# Patient Record
Sex: FEMALE
[De-identification: names, ages, dates, MRNs, and addresses within clinical notes are randomized per-mention and may not be internally consistent; named-entity substitution may affect disease eponyms.]

---

## 2022-06-10 ENCOUNTER — HOSPITAL ENCOUNTER (INPATIENT)
Dept: HOSPITAL 5 - ED | Age: 72
LOS: 3 days | Discharge: HOME | DRG: 682 | End: 2022-06-13
Attending: INTERNAL MEDICINE | Admitting: INTERNAL MEDICINE
Payer: COMMERCIAL

## 2022-06-10 DIAGNOSIS — M19.90: ICD-10-CM

## 2022-06-10 DIAGNOSIS — G40.909: ICD-10-CM

## 2022-06-10 DIAGNOSIS — I48.91: ICD-10-CM

## 2022-06-10 DIAGNOSIS — Z79.899: ICD-10-CM

## 2022-06-10 DIAGNOSIS — G93.41: ICD-10-CM

## 2022-06-10 DIAGNOSIS — E86.9: ICD-10-CM

## 2022-06-10 DIAGNOSIS — E86.0: ICD-10-CM

## 2022-06-10 DIAGNOSIS — Z82.49: ICD-10-CM

## 2022-06-10 DIAGNOSIS — N17.0: Primary | ICD-10-CM

## 2022-06-10 DIAGNOSIS — F01.50: ICD-10-CM

## 2022-06-10 DIAGNOSIS — E66.2: ICD-10-CM

## 2022-06-10 DIAGNOSIS — K21.9: ICD-10-CM

## 2022-06-10 LAB
ALBUMIN SERPL-MCNC: 4.8 G/DL (ref 3.9–5)
ALT SERPL-CCNC: 11 UNITS/L (ref 7–56)
BASOPHILS # (AUTO): 0 K/MM3 (ref 0–0.1)
BASOPHILS NFR BLD AUTO: 0.2 % (ref 0–1.8)
BUN SERPL-MCNC: 41 MG/DL (ref 7–17)
BUN/CREAT SERPL: 22 %
CALCIUM SERPL-MCNC: 10.1 MG/DL (ref 8.4–10.2)
EOSINOPHIL # BLD AUTO: 0 K/MM3 (ref 0–0.4)
EOSINOPHIL NFR BLD AUTO: 0 % (ref 0–4.3)
HCT VFR BLD CALC: 42.9 % (ref 30.3–42.9)
HEMOLYSIS INDEX: 8
HGB BLD-MCNC: 14.6 GM/DL (ref 10.1–14.3)
LYMPHOCYTES # BLD AUTO: 1 K/MM3 (ref 1.2–5.4)
LYMPHOCYTES NFR BLD AUTO: 10.6 % (ref 13.4–35)
MCHC RBC AUTO-ENTMCNC: 34 % (ref 30–34)
MCV RBC AUTO: 92 FL (ref 79–97)
MONOCYTES # (AUTO): 0.7 K/MM3 (ref 0–0.8)
MONOCYTES % (AUTO): 8 % (ref 0–7.3)
PLATELET # BLD: 249 K/MM3 (ref 140–440)
RBC # BLD AUTO: 4.66 M/MM3 (ref 3.65–5.03)
T4 FREE SERPL-MCNC: 1.24 NG/DL (ref 0.76–1.46)
T4 FREE SERPL-MCNC: 1.33 NG/DL (ref 0.76–1.46)

## 2022-06-10 PROCEDURE — 84443 ASSAY THYROID STIM HORMONE: CPT

## 2022-06-10 PROCEDURE — 82565 ASSAY OF CREATININE: CPT

## 2022-06-10 PROCEDURE — 93005 ELECTROCARDIOGRAM TRACING: CPT

## 2022-06-10 PROCEDURE — 85610 PROTHROMBIN TIME: CPT

## 2022-06-10 PROCEDURE — 70450 CT HEAD/BRAIN W/O DYE: CPT

## 2022-06-10 PROCEDURE — 84439 ASSAY OF FREE THYROXINE: CPT

## 2022-06-10 PROCEDURE — 93306 TTE W/DOPPLER COMPLETE: CPT

## 2022-06-10 PROCEDURE — 36415 COLL VENOUS BLD VENIPUNCTURE: CPT

## 2022-06-10 PROCEDURE — 82553 CREATINE MB FRACTION: CPT

## 2022-06-10 PROCEDURE — 85025 COMPLETE CBC W/AUTO DIFF WBC: CPT

## 2022-06-10 PROCEDURE — 85027 COMPLETE CBC AUTOMATED: CPT

## 2022-06-10 PROCEDURE — 83735 ASSAY OF MAGNESIUM: CPT

## 2022-06-10 PROCEDURE — 85730 THROMBOPLASTIN TIME PARTIAL: CPT

## 2022-06-10 PROCEDURE — 94640 AIRWAY INHALATION TREATMENT: CPT

## 2022-06-10 PROCEDURE — 82550 ASSAY OF CK (CPK): CPT

## 2022-06-10 PROCEDURE — 70551 MRI BRAIN STEM W/O DYE: CPT

## 2022-06-10 PROCEDURE — 80048 BASIC METABOLIC PNL TOTAL CA: CPT

## 2022-06-10 PROCEDURE — 80053 COMPREHEN METABOLIC PANEL: CPT

## 2022-06-10 PROCEDURE — 84484 ASSAY OF TROPONIN QUANT: CPT

## 2022-06-10 PROCEDURE — C8929 TTE W OR WO FOL WCON,DOPPLER: HCPCS

## 2022-06-10 RX ADMIN — Medication SCH ML: at 21:39

## 2022-06-10 RX ADMIN — ACETAMINOPHEN PRN MG: 325 TABLET ORAL at 18:53

## 2022-06-10 NOTE — CAT SCAN REPORT
CT head/brain wo con



INDICATION / CLINICAL INFORMATION:

71 years Female; syncope.



TECHNIQUE: Routine CT head without contrast. All CT scans at this location are performed using CT dos
e reduction for ALARA by means of automated exposure control.



COMPARISON: 

None.



FINDINGS:



BRAIN / INTRACRANIAL CONTENTS: No acute hemorrhage, mass effect, midline shift, hydrocephalus, or acu
te, large territorial infarct. No signs of significant atrophy or chronic infarct. No significant whi
te matter abnormality seen.



CRANIOCERVICAL JUNCTION: No significant abnormality.



ORBITS: No significant abnormality of visualized orbits.

SINUSES / MASTOIDS: Visualized paranasal sinuses and mastoid air cells are essentially clear.



ADDITIONAL FINDINGS: None. 



IMPRESSION:

1. No focal mass, hemorrhage, hydrocephalus, or acute, large territorial infarct. 



Signer Name: Walt Chin MD, III 

Signed: 6/10/2022 4:15 PM

Workstation Name: [id number]

## 2022-06-10 NOTE — HISTORY AND PHYSICAL REPORT
History of Present Illness


Chief complaint: 





I am shaking


History of present illness: 


70 YO Female with Obesity Hypoventilation Syndrome, GERD, Seizure Disorder, OA, 

Vascular Dementia, Cerebral Atherosclerosis presents to ED for evaluation.  

Patient is confused with diminished cognition and provides minimal history.  JASMIN pereira reports "I keep checking".  Additional history provided by EMS staff, ED 

staff, as well as the patient's daughter was made available by telephone for 

interview.  As per daughter the patient has experienced increased weakness and 

confusion over the past 1 month with persistent and worsening symptoms over the 

same timeframe.   The patient was found to have increased confusion today and 

subsequent loss of consciousness..  EMS was notified by the daughter for the 

aforementioned symptoms.  Upon arrival by EMS the patient was found to be in 

distress and subsequent transported to [facility] for further care and evaluation of 

the aforementioned symptoms.  The patient was seen and evaluated in the em

ergency department.  All lab and imaging studies reviewed.  Patient was found to

have new onset atrial fibrillation, CARON with ATN, volume depletion, metabolic 

encephalopathy.  Patient admitted to telemetry due to increased risk of 

worsening symptoms and for medical stabilization.  Patient is confused and 

lethargic at the time my evaluation but has a positive gag reflex and is able to

protect her airway without difficulty.  No prior admission for review.  All 

medication listed at time of admission has been reconciled.  Advanced care 

planning conducted in ED. [id number] score: 2





Past History


Past Medical History: arthritis, GERD, hypertension, seizures, other (See HPI)


Past Surgical History: total knee replacement


Social history: .  denies: smoking, alcohol abuse, prescription drug 

abuse


Family history: hypertension





Medications and Allergies


                                    Allergies











Allergy/AdvReac Type Severity Reaction Status Date / Time


 


codeine AdvReac  Itching Verified 06/10/22 07:52











                                Home Medications











 Medication  Instructions  Recorded  Confirmed  Last Taken  Type


 


Omeprazole Magnesium [Prilosec Otc] 20 mg PO QDAY 12/03/14 12/10/14 12/09/14 

History


 


Quetiapine Fumarate (Nf) [Seroquel 150 mg PO QDAY 12/03/14 12/10/14 12/09/14 

History





XR]     


 


Sertraline [Zoloft] 100 mg PO QDAY 12/03/14 12/10/14 12/09/14 History


 


Dexlansoprazole [Dexilant] 60 mg PO QDAY 12/10/14 12/10/14 12/09/14 History


 


LORazepam [Lorazepam] 1 mg PO BID 12/10/14 12/10/14 12/09/14 History














Review of Systems


ROS unobtainable: due to mental status





Exam





- Constitutional


Vitals: 


                                        











Temp Pulse Resp BP Pulse Ox


 


 98.0 F   72   16   152/84   99 


 


 06/10/22 07:50  06/10/22 12:40  06/10/22 12:40  06/10/22 12:40  06/10/22 12:40











General appearance: Present: mild distress, obese





- EENT


Eyes: Present: PERRL


ENT: hearing intact, clear oral mucosa, hearing decreased





- Neck


Neck: Present: supple, normal ROM





- Respiratory


Respiratory effort: normal


Respiratory: bilateral: diminished





- Cardiovascular


Rhythm: irregularly irregular





- Extremities


Extremities: no ischemia


Peripheral Pulses: within normal limits





- Abdominal


General gastrointestinal: Present: soft, non-tender, non-distended, normal bowel

sounds


Female genitourinary: Present: normal





- Integumentary


Integumentary: Present: clear, dry





- Musculoskeletal


Musculoskeletal: generalized weakness





- Psychiatric


Psychiatric: no appropriate mood/affect, no intact judgment & insight, no memory

intact





- Neurologic


Neurologic: CNII-XII intact, no focal deficits, moves all extremities, no gait 

normal





HEART Score





- HEART Score


Troponin: 


                                        











Troponin T  < 0.010 ng/mL (0.00-0.029)   06/10/22  09:31    














Results





- Labs


CBC & Chem 7: 


                                 06/10/22 09:31





                                 06/10/22 09:31


Labs: 


                              Abnormal lab results











  06/10/22 06/10/22 Range/Units





  09:31 09:31 


 


Hgb  14.6 H   (10.1-14.3)  gm/dl


 


Lymph % (Auto)  10.6 L   (13.4-35.0)  %


 


Mono % (Auto)  8.0 H   (0.0-7.3)  %


 


Lymph # (Auto)  1.0 L   (1.2-5.4)  K/mm3


 


Seg Neutrophils %  81.2 H   (40.0-70.0)  %


 


Potassium   3.5 L  (3.6-5.0)  mmol/L


 


BUN   41 H  (7-17)  mg/dL


 


Creatinine   1.9 H  (0.6-1.2)  mg/dL


 


Glucose   126 H  ()  mg/dL


 


Total Protein   8.8 H  (6.3-8.2)  g/dL














Assessment and Plan





- Patient Problems


(1) New onset atrial fibrillation


Current Visit: Yes   Status: Acute   


Plan to address problem: 


Cardiology team consulted in ED.  Rate currently controlled, EKG finding 

consistent with new onset atrial fibrillation.  Therapeutic anticoagulation as 

per cardiology team.








(2) Syncope


Current Visit: Yes   Status: Acute   


Qualifiers: 


   Encounter type: initial encounter 


Plan to address problem: 


CT head, neuro check, seizure precautions, fall precautions.








(3) Acute kidney injury (CARON) with acute tubular necrosis (ATN)


Current Visit: Yes   Status: Acute   


Plan to address problem: 


IV fluid resuscitation therapy, BMP, repeat BMP in a.m., monitor fluid balance.








(4) Vascular dementia


Current Visit: Yes   Status: Acute   


Qualifiers: 


   Dementia behavioral disturbance: without behavioral disturbance   Qualified 

Code(s): F01.50 - Vascular dementia without behavioral disturbance   


Plan to address problem: 


Verbal prompting, verbal redirection, benzodiazepine therapy as clinically 

indicated.








(5) Cerebral atherosclerosis


Current Visit: Yes   Status: Acute   


Plan to address problem: 


Risk factor reduction, antiplatelet therapy, supportive care.








(6) Metabolic encephalopathy


Current Visit: Yes   Status: Acute   


Plan to address problem: 


Supportive care, neuro check, seizure precaution, aspiration caution, fall 

precautions.








(7) Obesity hypoventilation syndrome


Current Visit: Yes   Status: Acute   


Plan to address problem: 


Balanced diet, increase physical activity discharge, outpatient pulmonary 

follow-up for sleep study.








(8) DVT prophylaxis


Current Visit: Yes   Status: Acute   


Plan to address problem: 


SCD to bilateral lower extremities while in bed








(9) Advance care planning


Current Visit: Yes   Status: Acute   


Plan to address problem: 


Disease education data, care plan discussed, diagnoses discussed, prognosis 

discussed, patient is full code.  +30 minutes.








(10) Preventative health care


Current Visit: Yes   Status: Acute   


Plan to address problem: 


Patient family counseled regarding home safety, outpatient follow-up with 

primary care physician for all age and risk factor appropriate screening test, 

+30 minutes.

## 2022-06-10 NOTE — EMERGENCY DEPARTMENT REPORT
HPI





- General


Chief Complaint: Neuro Symptoms/Deficit


Time Seen by Provider: 06/10/22 08:49





- HPI


HPI: 





Room 25











The patient is a 71-year-old female present with a chief complaint of hand 

tremors.  Patient was reportedly sent to the ED by her daughter who called EMS 

secondary to the patient exhibiting hand tremors.  Reportedly in the past the 

patient has experienced this when her potassium was low.  Patient appears 

confused initially states she does not know when asked why she was brought to 

the emergency department.  Patient later complains of hand tremors.  Patient 

denies history of heavy/frequent alcohol consumption





ED Past Medical Hx





- Past Medical History


Hx GERD: Yes (1999)


Hx Seizures: Yes (X1 OCCURANCE R/T ECTOPIC PREGNANCY 1980)





- Surgical History


Additional Surgical History: Bilateral knee surgery





- Family History


Family history: no significant





- Social History


Smoking Status: Never Smoker


Substance Use Type: None (Denies illicit drug use)





- Medications


Home Medications: 


                                Home Medications











 Medication  Instructions  Recorded  Confirmed  Last Taken  Type


 


Omeprazole Magnesium [Prilosec Otc] 20 mg PO QDAY 12/03/14 12/10/14 12/09/14 

History


 


Quetiapine Fumarate (Nf) [Seroquel 150 mg PO QDAY 12/03/14 12/10/14 12/09/14 

History





XR]     


 


Sertraline [Zoloft] 100 mg PO QDAY 12/03/14 12/10/14 12/09/14 History


 


Dexlansoprazole [Dexilant] 60 mg PO QDAY 12/10/14 12/10/14 12/09/14 History


 


LORazepam [Lorazepam] 1 mg PO BID 12/10/14 12/10/14 12/09/14 History














ED Review of Systems


ROS: 


Stated complaint: TREMMORS,SHAKES


Other details as noted in HPI





Constitutional: no symptoms reported


Eyes: denies: eye pain


ENT: denies: throat pain


Respiratory: no symptoms reported


Cardiovascular: denies: chest pain


Endocrine: no symptoms reported


Gastrointestinal: denies: abdominal pain


Genitourinary: denies: dysuria


Musculoskeletal: denies: back pain


Neurological: other (Tremors)





Physical Exam





- Physical Exam


Vital Signs: 


                                   Vital Signs











  06/10/22





  07:50


 


Temperature 98.0 F


 


Pulse Rate 120 H


 


Respiratory 16





Rate 


 


Blood Pressure 124/74





[Left] 


 


O2 Sat by Pulse 97





Oximetry 











Physical Exam: 





GENERAL: The patient is well-developed well-nourished female lying on stretcher 

sleeping not appearing to be in acute distress.  Patient easily awakened to 

verbal stimuli


HEENT: Normocephalic.  Atraumatic.  Extraocular motions are intact.  Patient has

 moist mucous membranes.


NECK: Supple.  Trachea midline


CHEST/LUNGS: Clear to auscultation.  There is no respiratory distress noted.


HEART/CARDIOVASCULAR: Regular.  There is no tachycardia.  There is no gallop rub

 or murmur.


ABDOMEN: Abdomen is soft, nontender.  Patient has normal bowel sounds.  There is

 no abdominal distention.


SKIN: There is no rash.  There is no edema.  There is no diaphoresis.


NEURO: The patient is awake, alert, and oriented.  The patient is cooperative.  

The patient has no focal neurologic deficits.  The patient has normal speech.  

GCS 15.  Cranial nerves II through XII grossly intact.  Trace tremors noted 

occasionally in hands


MUSCULOSKELETAL:   There is no evidence of acute injury.





ED Course


                                   Vital Signs











  06/10/22





  07:50


 


Temperature 98.0 F


 


Pulse Rate 120 H


 


Respiratory 16





Rate 


 


Blood Pressure 124/74





[Left] 


 


O2 Sat by Pulse 97





Oximetry 














ED Medical Decision Making





- Lab Data


Result diagrams: 


                                 06/10/22 09:31





                                 06/10/22 09:31





                                Laboratory Tests











  06/10/22 06/10/22 06/10/22





  09:31 09:31 09:31


 


WBC  9.2  


 


RBC  4.66  


 


Hgb  14.6 H  


 


Hct  42.9  


 


MCV  92  


 


MCH  31  


 


MCHC  34  


 


RDW  13.7  


 


Plt Count  249  


 


Lymph % (Auto)  10.6 L  


 


Mono % (Auto)  8.0 H  


 


Eos % (Auto)  0.0  


 


Baso % (Auto)  0.2  


 


Lymph # (Auto)  1.0 L  


 


Mono # (Auto)  0.7  


 


Eos # (Auto)  0.0  


 


Baso # (Auto)  0.0  


 


Seg Neutrophils %  81.2 H  


 


Seg Neutrophils #  7.5  


 


Sodium   142 


 


Potassium   3.5 L 


 


Chloride   98.7 


 


Carbon Dioxide   23 


 


Anion Gap   24 


 


BUN   41 H 


 


Creatinine   1.9 H 


 


Estimated GFR   26 


 


BUN/Creatinine Ratio   22 


 


Glucose   126 H 


 


Calcium   10.1 


 


Magnesium   2.00 


 


Total Bilirubin   0.40 


 


AST   18 


 


ALT   11 


 


Alkaline Phosphatase   97 


 


Total Creatine Kinase   117 


 


CK-MB (CK-2)   3.8 


 


CK-MB (CK-2) Rel Index   3.2 


 


Troponin T   < 0.010 


 


Total Protein   8.8 H 


 


Albumin   4.8 


 


Albumin/Globulin Ratio   1.2 


 


TSH    1.060


 


Free T4    1.33














- EKG Data


-: EKG Interpreted by Me


Rate: tachycardia





- EKG Data


When compared to previous EKG there are: previous EKG unavailable


Interpretation: other (Atrial fibrillation at 100 bpm)





- Differential Diagnosis


Resting tremor, hyperthyroidism, electrolyte abnormality


Critical care attestation.: 


If time is entered above; I have spent that time in minutes in the direct care 

of this critically ill patient, excluding procedure time.








ED Disposition


Clinical Impression: 


 Atrial fibrillation, Acute kidney injury





Disposition: 09 ADMITTED AS INPATIENT


Is pt being admited?: Yes


Does the pt Need Aspirin: No


Condition: Fair


Time of Disposition: 13:00 (Care transferred to hospitalist (Dr Moore))

## 2022-06-10 NOTE — ELECTROCARDIOGRAPH REPORT
[facility]

                                       

Test Date:    2022-06-10               Test Time:    11:51:24

Pat Name:     ROBB SAINI           Department:   

Patient ID:   [id number]          Room:          

Gender:       F                        Technician:   GP

:          1950               Requested By: TAYLOR STAPLETON

Order Number: [id number]              Reading MD:   Sandie Felton

                                 Measurements

Intervals                              Axis          

Rate:         100                      P:            

SD:                                    QRS:          -31

QRSD:         106                      T:            151

QT:           375                                    

QTc:          485                                    

                           Interpretive Statements

Atrial fibrillation

Left axis deviation

Poor R wave progression

Nonspecific intraventricular conduction delay

No previous ECG available for comparison

Electronically Signed On 6- 13:36:34 EDT by Sandie Felton

## 2022-06-11 LAB
APTT BLD: 29.6 SEC. (ref 24.2–36.6)
BUN SERPL-MCNC: 37 MG/DL (ref 7–17)
BUN/CREAT SERPL: 31 %
CALCIUM SERPL-MCNC: 9.2 MG/DL (ref 8.4–10.2)
HCT VFR BLD CALC: 38.5 % (ref 30.3–42.9)
HEMOLYSIS INDEX: 11
HGB BLD-MCNC: 13.3 GM/DL (ref 10.1–14.3)
INR PPP: 0.95 (ref 0.87–1.13)
MCHC RBC AUTO-ENTMCNC: 35 % (ref 30–34)
MCV RBC AUTO: 92 FL (ref 79–97)
PLATELET # BLD: 197 K/MM3 (ref 140–440)
RBC # BLD AUTO: 4.18 M/MM3 (ref 3.65–5.03)

## 2022-06-11 RX ADMIN — SERTRALINE SCH MG: 100 TABLET, FILM COATED ORAL at 11:24

## 2022-06-11 RX ADMIN — Medication SCH ML: at 10:20

## 2022-06-11 RX ADMIN — PANTOPRAZOLE SODIUM SCH MG: 40 TABLET, DELAYED RELEASE ORAL at 14:05

## 2022-06-11 RX ADMIN — METOPROLOL TARTRATE SCH: 25 TABLET, FILM COATED ORAL at 22:29

## 2022-06-11 RX ADMIN — APIXABAN SCH MG: 5 TABLET, FILM COATED ORAL at 21:49

## 2022-06-11 RX ADMIN — APIXABAN SCH MG: 5 TABLET, FILM COATED ORAL at 14:05

## 2022-06-11 RX ADMIN — Medication SCH ML: at 22:29

## 2022-06-11 RX ADMIN — METOPROLOL TARTRATE SCH MG: 25 TABLET, FILM COATED ORAL at 11:24

## 2022-06-11 NOTE — CONSULTATION
History of Present Illness


Consult date: 06/11/22


Consult reason: atrial fibrillation


History of present illness: 








71-year-old female is admitted with weakness and confusion.  Currently, her ment

al status is back to baseline and she is alert and oriented.  She reports that 

she is followed by Dr. Fountain at [facility] and has been 

anticoagulated with Eliquis as outpatient for history of AF.  No prior history 

of DVT/PE or strokes per patient.  She currently is without any acute cardiac 

complaints and denies any chest pain, shortness of breath, or palpitations.  

Work-up notable for EKG with rate controlled AF, troponin x1 negative, normal 

thyroid studies, and CARON with Cr 1.9 on presentation (now resolved after IV 

fluids).  CT of head negative.





Past History


Past Medical History: arthritis, GERD, hypertension, seizures, other (See HPI)


Past Surgical History: total knee replacement


Social history: .  denies: smoking, alcohol abuse, prescription drug 

abuse


Family history: hypertension





Medications and Allergies


                                    Allergies











Allergy/AdvReac Type Severity Reaction Status Date / Time


 


codeine AdvReac  Itching Verified 06/10/22 07:52











                                Home Medications











 Medication  Instructions  Recorded  Confirmed  Last Taken  Type


 


Omeprazole Magnesium [Prilosec Otc] 20 mg PO QDAY 12/03/14 06/11/22 12/09/14 

History


 


Quetiapine Fumarate (Nf) [Seroquel 150 mg PO QDAY 12/03/14 06/11/22 12/09/14 

History





XR]     


 


Sertraline [Zoloft] 100 mg PO QDAY 12/03/14 06/11/22 12/09/14 History


 


Dexlansoprazole [Dexilant] 60 mg PO QDAY 12/10/14 06/11/22 12/09/14 History


 


LORazepam [Lorazepam] 1 mg PO BID 12/10/14 06/11/22 12/09/14 History











Active Meds: 


Active Medications





Acetaminophen (Acetaminophen 325 Mg Tab)  650 mg PO Q4H PRN


   PRN Reason: Pain MILD(1-3)/Fever >100.5/HA


   Last Admin: 06/10/22 18:53 Dose:  650 mg


   


Albuterol (Albuterol 2.5 Mg/3 Ml Nebu)  2.5 mg IH Q4H PRN


   PRN Reason: Shortness Of Breath


Apixaban (Apixaban 5 Mg Tab)  5 mg PO Q12HR [facility]; Protocol


Hydromorphone HCl (Hydromorphone 0.5 Mg/0.5 Ml Inj)  0.5 mg IV Q13H PRN


   PRN Reason: Pain , Severe (7-10)


Lorazepam (Lorazepam 1 Mg Tab)  1 mg PO BID [facility]


Metoprolol Tartrate (Metoprolol Tartrate 25 Mg Tab)  12.5 mg PO BID [facility]


Miscellaneous Medication (Quetiapine Fumarate (Nf))  150 mg PO QDAY [facility]


Miscellaneous Medication (Dexlansoprazole [Dexilant])  60 mg PO QDAY [facility]


Ondansetron HCl (Ondansetron 4 Mg/2 Ml Inj)  4 mg IV Q8H PRN


   PRN Reason: Nausea And Vomiting


Oxycodone/Acetaminophen (Oxycodone /Acetaminophen 5-325mg Tab)  1 tab PO Q16H 

PRN


   PRN Reason: Pain, Moderate (4-6)


Sertraline HCl (Sertraline 100 Mg Tab)  100 mg PO QDAY [facility]


Sodium Chloride (Sodium Chloride 0.9% 10 Ml Flush Syringe)  10 ml IV BID [facility]


   Last Admin: 06/10/22 21:39 Dose:  10 ml


   


Sodium Chloride (Sodium Chloride 0.9% 10 Ml Flush Syringe)  10 ml IV PRN PRN


   PRN Reason: LINE FLUSH











Physical Examination


                                   Vital Signs











Temp Pulse Resp BP Pulse Ox


 


 98.0 F   120 H  16   124/74   97 


 


 06/10/22 07:50  06/10/22 07:50  06/10/22 07:50  06/10/22 07:50  06/10/22 07:50











Narrative exam: 








Gen-NAD, comfortable


HEENT-normocephalic, atraumatic


CV-irregularly irregular rhythm, regular rate, no murmurs


Lungs-CTAB, no increased WOB


Abd-soft, nt,nd


Ext-no edema, warm to touch


Neuro-no focal deficits, alert and oriented


Psych-normal affect, no agitation








Results





                                 06/10/22 09:31





                                 06/11/22 04:38


                          Comprehensive Metabolic Panel











  06/11/22 Range/Units





  04:38 


 


Sodium  143  (137-145)  mmol/L


 


Potassium  3.3 L  (3.6-5.0)  mmol/L


 


Chloride  103.7  ()  mmol/L


 


Carbon Dioxide  25  (22-30)  mmol/L


 


BUN  37 H  (7-17)  mg/dL


 


Creatinine  1.2  (0.6-1.2)  mg/dL


 


Glucose  111 H  ()  mg/dL


 


Calcium  9.2  (8.4-10.2)  mg/dL











6/10/2022 EKGatrial fibrillation, heart rate 100, left axis, incomplete left 

bundle branch block


6/2022 Echonormal LVEF, normal biatrial size





Assessment and Plan








#Atrial fibrillation, unclear chronicity


#Confusionresolved


#Acute kidney injuryresolved with IV fluids


#Reported history of vascular dementiapatient denies any prior strokes





Patient reports history of atrial fibrillation, but it is unclear whether this 

is paroxysmal or persistent at this point.


Will start metoprolol 12.5 mg p.o. twice daily.


[id number] is at least 2 (3 if vascular dementia).  Recommend continuing 

Eliquis 5 mg p.o. twice daily.

## 2022-06-11 NOTE — PROGRESS NOTE
Assessment and Plan





(1) New onset atrial fibrillation


Current Visit: Yes   Status: Acute   


Plan to address problem: 


Cardiology team consulted in ED.  Rate currently controlled, EKG finding 

consistent with new onset atrial fibrillation.  Therapeutic anticoagulation as p

er cardiology team.








(2) Syncope


Current Visit: Yes   Status: Acute   


Qualifiers: 


   Encounter type: initial encounter 


Plan to address problem: 


CT head, neuro check, seizure precautions, fall precautions.








(3) Acute kidney injury (CARON) with acute tubular necrosis (ATN)


Current Visit: Yes   Status: Acute   


Plan to address problem: 


IV fluid resuscitation therapy, BMP, repeat BMP in a.m., monitor fluid balance.








(4) Vascular dementia


Current Visit: Yes   Status: Acute   


Qualifiers: 


   Dementia behavioral disturbance: without behavioral disturbance   Qualified 

Code(s): F01.50 - Vascular dementia without behavioral disturbance   


Plan to address problem: 


Verbal prompting, verbal redirection, benzodiazepine therapy as clinically 

indicated.








(5) Cerebral atherosclerosis


Current Visit: Yes   Status: Acute   


Plan to address problem: 


Risk factor reduction, antiplatelet therapy, supportive care.








(6) Metabolic encephalopathy


Current Visit: Yes   Status: Acute   


Plan to address problem: 


Supportive care, neuro check, seizure precaution, aspiration caution, fall pr

ecautions.








(7) Obesity hypoventilation syndrome


Current Visit: Yes   Status: Acute   


Plan to address problem: 


Balanced diet, increase physical activity discharge, outpatient pulmonary 

follow-up for sleep study.








(8) DVT prophylaxis


Current Visit: Yes   Status: Acute   


Plan to address problem: 


SCD to bilateral lower extremities while in bed








(9) Advance care planning


Current Visit: Yes   Status: Acute   


Plan to address problem: 


Disease education data, care plan discussed, diagnoses discussed, prognosis 

discussed, patient is full code.  +30 minutes.








(10) Preventative health care


Current Visit: Yes   Status: Acute   


Plan to address problem: 


Patient family counseled regarding home safety, outpatient follow-up with 

primary care physician for all age and risk factor appropriate screening test, 

+30 minutes.





-- Initiated on Eliquis, mental status significantly improved, discussed with 

daughter at the bedside.  Patient placed on rate control medicine for atrial 

fibrillation.  We will monitor H&H and will follow 2D echo, cardiology 

following.





Subjective


Date of service: 06/11/22


Interval history: 





Patient seen and examined.  Medical records and medication list reviewed.  


No acute event overnight noted by the RN.  


Patient denies any chest pain or difficulty breathing.  Patient is tolerating 

diet.  


Discussed plan of care at bedside with patient.








Objective





- Exam


Narrative Exam: 





GENERAL:  well-developed and obese female lying on bed appeared to be in no 

discomfort. 


HEENT: Normocephalic.  Atraumatic.  No conjunctival congestion or icterus. 

Patient has moist mucous membranes.


NECK: Supple.  Trachea midline.


CHEST/LUNGS: Clear to auscultated bilaterally, breathing nonlabored. No wheezes 

crackles or rhonchi.


HEART/CARDIOVASCULAR: irRegular in rate and rhythm.  S1 and S2 positive.


ABDOMEN: Abdomen is soft, nontender.  Patient has normal bowel sounds.  


SKIN: There is no rash.  Warm and dry.


NEURO:  No focal motor deficit.  Follows command.


MUSCULOSKELETAL: No joint effusion or tenderness.


EXTRIMITY: No edema, no cyanosis or clubbing.


PSYCH:  Cooperative. 





- Constitutional


Vitals: 


                               Vital Signs - 12hr











  06/10/22 06/11/22





  23:37 03:19


 


Temperature 99.4 F 99.1 F


 


Pulse Rate 97 H 91 H


 


Respiratory 18 18





Rate  


 


Blood Pressure 117/66 126/70


 


O2 Sat by Pulse 93 97





Oximetry  














- Labs


CBC & Chem 7: 


                                 06/11/22 11:52





                                 06/11/22 11:52


Labs: 


                              Abnormal lab results











  06/11/22 Range/Units





  04:38 


 


Potassium  3.3 L  (3.6-5.0)  mmol/L


 


BUN  37 H  (7-17)  mg/dL


 


Glucose  111 H  ()  mg/dL














HEART Score





- HEART Score


Troponin: 


                                        











Troponin T  < 0.010 ng/mL (0.00-0.029)   06/10/22  09:31

## 2022-06-12 RX ADMIN — METOPROLOL TARTRATE SCH: 25 TABLET, FILM COATED ORAL at 18:33

## 2022-06-12 RX ADMIN — Medication SCH ML: at 22:40

## 2022-06-12 RX ADMIN — APIXABAN SCH MG: 5 TABLET, FILM COATED ORAL at 10:08

## 2022-06-12 RX ADMIN — ACETAMINOPHEN PRN MG: 325 TABLET ORAL at 05:12

## 2022-06-12 RX ADMIN — METOPROLOL TARTRATE SCH MG: 25 TABLET, FILM COATED ORAL at 22:40

## 2022-06-12 RX ADMIN — APIXABAN SCH MG: 5 TABLET, FILM COATED ORAL at 22:39

## 2022-06-12 RX ADMIN — SERTRALINE SCH MG: 100 TABLET, FILM COATED ORAL at 10:07

## 2022-06-12 RX ADMIN — Medication SCH ML: at 10:07

## 2022-06-12 RX ADMIN — PANTOPRAZOLE SODIUM SCH MG: 40 TABLET, DELAYED RELEASE ORAL at 10:08

## 2022-06-12 RX ADMIN — METOPROLOL TARTRATE SCH MG: 25 TABLET, FILM COATED ORAL at 10:08

## 2022-06-12 NOTE — PROGRESS NOTE
Assessment and Plan








#Atrial fibrillation, unclear chronicity


#Confusionresolved


#Acute kidney injuryresolved with IV fluids


#Reported history of vascular dementiapatient denies any prior strokes





Telemetry shows rate controlled atrial fibrillation.  Continue metoprolol 12.5 

mg p.o. twice daily.


[id number] is at least 2 (3 if vascular dementia).  Continue Eliquis 5 mg p.o.

twice daily.





Subjective


Date of service: 06/12/22


Interval history: 





No events.  Denies chest pain or shortness of breath.





Telemetryrate controlled atrial fibrillation





Objective


                                   Vital Signs











  Temp Pulse Pulse Resp BP Pulse Ox


 


 06/12/22 07:55       94


 


 06/12/22 07:48   75    153/83  94


 


 06/12/22 04:49  97.9 F  83   20  160/93  96


 


 06/12/22 00:41  98.1 F  95 H   20  112/71  93


 


 06/12/22 00:00   101 H    


 


 06/11/22 22:29   88    112/71 


 


 06/11/22 22:00       97


 


 06/11/22 19:22  98.8 F  88   20  112/69  97


 


 06/11/22 15:30   83   16  136/85  90


 


 06/11/22 12:00   80  80    97


 


 06/11/22 11:30  98.3 F  73   16  138/80  98














- Physical Examination


Narrative exam: 








Gen-NAD, comfortable


HEENT-normocephalic, atraumatic


CV-irregularly irregular rhythm, regular rate, no murmurs


Lungs-CTAB, no increased WOB


Abd-soft, nt,nd


Ext-trace edema, warm to touch


Neuro-no focal deficits, alert and oriented


Psych-normal affect, no agitation








- Labs and Meds


                                   Coagulation











  06/11/22 Range/Units





  11:52 


 


PT  13.7  (12.2-14.9)  Sec.


 


INR  0.95  (0.87-1.13)  


 


APTT  29.6  (24.2-36.6)  Sec.








                                       CBC











  06/11/22 Range/Units





  11:52 


 


WBC  5.9  (4.5-11.0)  K/mm3


 


RBC  4.18  (3.65-5.03)  M/mm3


 


Hgb  13.3  (10.1-14.3)  gm/dl


 


Hct  38.5  (30.3-42.9)  %


 


Plt Count  197  (140-440)  K/mm3








                          Comprehensive Metabolic Panel











  06/11/22 Range/Units





  11:52 


 


Creatinine  1.0  (0.6-1.2)  mg/dL

## 2022-06-12 NOTE — PROGRESS NOTE
Assessment and Plan


--New onset seizure


-According to the daughter patient was having jerking movements before passing 

out and does not have any prior h/o seizure


-cont on Keppra and will follow neurology recommendation 


-ordered MRI brain





(1) New onset atrial fibrillation


Current Visit: Yes   Status: Acute   


Plan to address problem: 


Cardiology team consulted in ED.  Rate currently controlled, EKG finding 

consistent with new onset atrial fibrillation.  Therapeutic anticoagulation as 

per cardiology team.








(2) Syncope


Current Visit: Yes   Status: Acute   


Qualifiers: 


   Encounter type: initial encounter 


Plan to address problem: 


CT head, neuro check, seizure precautions, fall precautions.








(3) Acute kidney injury (CARON) with acute tubular necrosis (ATN)


Current Visit: Yes   Status: Acute   


Plan to address problem: 


IV fluid resuscitation therapy, BMP, repeat BMP in a.m., monitor fluid balance.








(4) Vascular dementia


Current Visit: Yes   Status: Acute   


Qualifiers: 


   Dementia behavioral disturbance: without behavioral disturbance   Qualified 

Code(s): F01.50 - Vascular dementia without behavioral disturbance   


Plan to address problem: 


Verbal prompting, verbal redirection, benzodiazepine therapy as clinically 

indicated.








(5) Cerebral atherosclerosis


Current Visit: Yes   Status: Acute   


Plan to address problem: 


Risk factor reduction, antiplatelet therapy, supportive care.








(6) Metabolic encephalopathy


Current Visit: Yes   Status: Acute   


Plan to address problem: 


Supportive care, neuro check, seizure precaution, aspiration caution, fall 

precautions.








(7) Obesity hypoventilation syndrome


Current Visit: Yes   Status: Acute   


Plan to address problem: 


Balanced diet, increase physical activity discharge, outpatient pulmonary foll

ow-up for sleep study.








(8) DVT prophylaxis


Current Visit: Yes   Status: Acute   


Plan to address problem: 


SCD to bilateral lower extremities while in bed








(9) Advance care planning


Current Visit: Yes   Status: Acute   


Plan to address problem: 


Disease education data, care plan discussed, diagnoses discussed, prognosis 

discussed, patient is full code.  +30 minutes.








(10) Preventative health care


Current Visit: Yes   Status: Acute   


Plan to address problem: 


Patient family counseled regarding home safety, outpatient follow-up with 

primary care physician for all age and risk factor appropriate screening test, 

+30 minutes.





-- Initiated on Eliquis, mental status significantly improved, discussed with 

daughter at the bedside.  Renal function normal,   Continue Keppra 500 mg twice 

daily.  wait for MRI brain to be done and neuro eval, PT consulted and pending 

recommendations





Subjective


Date of service: 06/12/22


Interval history: 





Patient seen and examined.  Medical records and medication list reviewed.  


No acute event overnight noted by the RN.  


Patient denies any chest pain or difficulty breathing.  Patient is tolerating 

diet.  


Discussed plan of care at bedside with patient.








Objective





- Exam


Narrative Exam: 





GENERAL:  well-developed and obese female lying on bed appeared to be in no 

discomfort. 


HEENT: Normocephalic.  Atraumatic.  No conjunctival congestion or icterus. 

Patient has moist mucous membranes.


NECK: Supple.  Trachea midline.


CHEST/LUNGS: Clear to auscultated bilaterally, breathing nonlabored. No wheezes 

crackles or rhonchi.


HEART/CARDIOVASCULAR: irRegular in rate and rhythm.  S1 and S2 positive.


ABDOMEN: Abdomen is soft, nontender.  Patient has normal bowel sounds.  


SKIN: There is no rash.  Warm and dry.


NEURO:  No focal motor deficit.  Follows command.


MUSCULOSKELETAL: No joint effusion or tenderness.


EXTRIMITY: No edema, no cyanosis or clubbing.


PSYCH:  Cooperative. 





- Constitutional


Vitals: 


                               Vital Signs - 12hr











  06/12/22 06/12/22 06/12/22





  04:49 07:48 07:55


 


Temperature 97.9 F  


 


Pulse Rate 83 75 


 


Respiratory 20  





Rate   


 


Blood Pressure 160/93 153/83 


 


O2 Sat by Pulse 96 94 94





Oximetry   














  06/12/22





  10:00


 


Temperature 


 


Pulse Rate 


 


Respiratory 





Rate 


 


Blood Pressure 


 


O2 Sat by Pulse 98





Oximetry 














- Labs


CBC & Chem 7: 


                                 06/13/22 05:48





                                 06/11/22 11:52





HEART Score





- HEART Score


Troponin: 


                                        











Troponin T  < 0.010 ng/mL (0.00-0.029)   06/10/22  09:31

## 2022-06-13 VITALS — DIASTOLIC BLOOD PRESSURE: 76 MMHG | SYSTOLIC BLOOD PRESSURE: 141 MMHG

## 2022-06-13 LAB
HCT VFR BLD CALC: 38.7 % (ref 30.3–42.9)
HGB BLD-MCNC: 13.1 GM/DL (ref 10.1–14.3)
MCHC RBC AUTO-ENTMCNC: 34 % (ref 30–34)
MCV RBC AUTO: 93 FL (ref 79–97)
PLATELET # BLD: 185 K/MM3 (ref 140–440)
RBC # BLD AUTO: 4.16 M/MM3 (ref 3.65–5.03)

## 2022-06-13 RX ADMIN — Medication SCH: at 11:42

## 2022-06-13 RX ADMIN — SERTRALINE SCH MG: 100 TABLET, FILM COATED ORAL at 11:40

## 2022-06-13 RX ADMIN — PANTOPRAZOLE SODIUM SCH: 40 TABLET, DELAYED RELEASE ORAL at 11:42

## 2022-06-13 RX ADMIN — METOPROLOL TARTRATE SCH MG: 25 TABLET, FILM COATED ORAL at 11:40

## 2022-06-13 RX ADMIN — APIXABAN SCH MG: 5 TABLET, FILM COATED ORAL at 11:40

## 2022-06-13 NOTE — DISCHARGE SUMMARY
Providers





- Providers


Date of Admission: 


06/10/22 13:04





Date of discharge: 06/13/22


Attending physician: 


HERMAN LEE





                                        





06/10/22 13:34


Consult to Physician [CONS] Routine 


   Comment: 


   Consulting Provider: DENNY RIVERS


   Physician Instructions: 


   Reason For Exam: new onset a fib





06/12/22 14:00


Physical Therapy Evaluation and Treat [CONS] Routine 


   Comment: 


   Reason For Exam: Debility





06/12/22 14:05


Consult to Physician [CONS] Routine 


   Comment: 


   Consulting Provider: RAMIREZ TYLER


   Physician Instructions: 


   Reason For Exam: Breakthrough seizure











Primary care physician: 


PRIMARY CARE MD








Hospitalization


Condition: Fair


Hospital course: 





71-year-old female with history of hypertension, atrial fibrillation 

anticoagulated with Eliquis is admitted to the hospital with weakness and 

confusion.  Patient noted to be in atrial fibrillation on telemetry.  CT head 

was unremarkable.  Patient and daughter at bedside described that patient was 

having some jerking movement but she does not have any history of seizure.  

Patient noted to have CARON and placed on IV fluid, her renal function then become

 normal.  Currently, her mental status is back to baseline and she is alert and 

oriented.  She reports that she is followed by Dr. Fountain at [facility]. Work-up notable for EKG with rate controlled AF, troponin x1 

negative, normal thyroid studies, and CARON with Cr 1.9 on presentation (now 

resolved after IV fluids).  CT of head negative.  Initially patient was 

suspected for possible seizure and started on Keppra.  Neurology was consulted 

and evaluated the patient.  Based on history and clinical finding it was 

concluded that patient might have convulsive syncope.  Keppra was discontinued. 

 Patient was then discharged home in stable condition with outpatient follow-up 

with neurology and outpatient EEG.








Final Discharge Diagnosis (Prints w/discharge instructions): -- Acute convulsive

 syncope.  -- Atrial fibrillation anticoagulated with Eliquis.  -- CARON likely 

ATN versus vasomotor nephropathy.  -- Metabolic encephalopathy likely due to CARON

 and dehydration.  -- Morbid obesity.  -- Suspected vascular dementia, need 

further outpatient work-up


Time spent for discharge: 34 minutes





Core Measure Documentation





- Palliative Care


Palliative Care/ Comfort Measures: Not Applicable





- Core Measures


Any of the following diagnoses?: none





Exam





- Physical Exam


Narrative exam: 





GENERAL:  well-developed and obese female lying on bed appeared to be in no 

discomfort. 


HEENT: Normocephalic.  Atraumatic.  No conjunctival congestion or icterus. 

Patient has moist mucous membranes.


NECK: Supple.  Trachea midline.


CHEST/LUNGS: Clear to auscultated bilaterally, breathing nonlabored. No wheezes 

crackles or rhonchi.


HEART/CARDIOVASCULAR: irRegular in rate and rhythm.  S1 and S2 positive.


ABDOMEN: Abdomen is soft, nontender.  Patient has normal bowel sounds.  


SKIN: There is no rash.  Warm and dry.


NEURO:  No focal motor deficit.  Follows command.


MUSCULOSKELETAL: No joint effusion or tenderness.


EXTRIMITY: No edema, no cyanosis or clubbing.


PSYCH:  Cooperative. 





- Constitutional


Vitals: 


                                        











Temp Pulse Resp BP Pulse Ox


 


 97.6 F   80   16   141/76   92 


 


 06/13/22 16:07  06/13/22 16:07  06/13/22 06:33  06/13/22 16:07  06/13/22 16:07














Plan


Activity: advance as tolerated


Weight Bearing Status: Weight Bear as Tolerated


Diet: low fat, low salt


Additional Instructions: Follow-up with neurology as outpatient for possible EEG


Follow up with: 


PRIMARY CARE,MD [Primary Care Provider] - 3-5 Days


SHARRON SANTANA MD [Staff Physician] - 7 Days


Prescriptions: 


Apixaban [Eliquis] 5 mg PO Q12HR #60 tablet


Metoprolol [Lopressor TAB] 25 mg PO BID #60 tablet

## 2022-06-13 NOTE — PROGRESS NOTE
Assessment and Plan





- Patient Problems


(1) Atrial fibrillation


Current Visit: Yes   Status: Acute   


Plan to address problem: 


Patient's atrial fibrillation appears chronic, with stable rate control and on 

stable oral anticoagulant therapy.  Continue current management.








Subjective


Date of service: 06/13/22


Principal diagnosis: Altered mental status


Interval history: 





Patient is comfortable, no acute distress, no cardiac complaints.  On telemetry 

monitor she remains in atrial fibrillation with a well-controlled ventricular 

rate.





Objective


                                   Vital Signs











  Temp Pulse Resp BP Pulse Ox


 


 06/13/22 10:00      98


 


 06/13/22 08:31  97.5 F L  81   140/76  95


 


 06/13/22 06:33  98.0 F  76  16  136/74  94


 


 06/13/22 00:43  98.4 F  81  16  150/76  95


 


 06/12/22 23:05      98


 


 06/12/22 20:59  98.1 F  87  24  149/77  92


 


 06/12/22 20:00      98


 


 06/12/22 15:12  98.0 F  82  20  155/94  96


 


 06/12/22 12:00   75   














- Physical Examination


General: No Apparent Distress


HEENT: Positive: PERRL


Neck: Positive: neck supple


Cardiac: Positive: irregularly irregular


Lungs: Positive: clear to auscultation


Neuro: Positive: Grossly Intact


Abdomen: Positive: Soft


Skin: Positive: Clear


Extremities: Absent: edema





- Labs and Meds


                                       CBC











  06/13/22 Range/Units





  05:48 


 


WBC  5.7  (4.5-11.0)  K/mm3


 


RBC  4.16  (3.65-5.03)  M/mm3


 


Hgb  13.1  (10.1-14.3)  gm/dl


 


Hct  38.7  (30.3-42.9)  %


 


Plt Count  185  (140-440)  K/mm3

## 2022-06-13 NOTE — MAGNETIC RESONANCE REPORT
MR brain wo con



INDICATION / CLINICAL INFORMATION:

Possible seizure.



TECHNIQUE: 

Multiplanar, multisequence MR images of the brain were obtained.



COMPARISON: 

Jenn 10, 2022 



FINDINGS:



INTRACRANIAL: No restricted diffusion. No hemorrhage. Ventricular caliber is normal. No extra-axial c
ollection. No mass.  No herniation. Major intracranial vascular flow voids are preserved. Small quant
ity of scattered T2 signal white matter hyperintensities, most consistent with mild sequela of chroni
c microvascular disease which is a common finding in this age. 



ORBITS: No significant abnormality of visualized orbits.



SINUSES / MASTOIDS: No significant abnormality of visualized sinuses and mastoid air cells.



ADDITIONAL FINDINGS: None. 



IMPRESSION:

1. No significant intracranial abnormality. 



Signer Name: Guille Trivedi MD 

Signed: 6/13/2022 11:41 AM

Workstation Name: [id number]

## 2022-06-14 NOTE — CONSULTATION
History of Present Illness


Consult date: 06/13/22


Reason for Consult: Seizure


Chief complaint: 





I was confused.


History of present illness: 





70 yo female with htn, "seizure", gerd, arthritis, bilateral knee replacement, 

who presents with an episode where she was able to converse with her 

granddaughter (currently at bedside) while she was shaking at her bilateral 

extremities and during this time she was able to recognize Nicole and converse 

with her despite the bilateral shaking.  Similar episode in the past is referred

to as "seizure" in the setting of hypokalemia, per Nicole.  Currently, the 

patient feels much better and at her baseline.  Upon ED arrival, noted with 

acute kidney injury (pre-renal) w/ borderline hypokalemia.





Past History


Past Medical History: arthritis, GERD, hypertension, seizures, other (See HPI)


Past Surgical History: total knee replacement


Social history: .  denies: smoking, alcohol abuse, prescription drug 

abuse


Family history: hypertension





Medications and Allergies


                                    Allergies











Allergy/AdvReac Type Severity Reaction Status Date / Time


 


codeine AdvReac  Itching Verified 06/10/22 07:52











                                Home Medications











 Medication  Instructions  Recorded  Confirmed  Last Taken  Type


 


Omeprazole Magnesium [PriLOSEC Otc] 20 mg PO QDAY 12/03/14 06/11/22 12/09/14 

History


 


Quetiapine Fumarate (Nf) [SEROquel 150 mg PO QDAY 12/03/14 06/11/22 12/09/14 

History





XR]     


 


Sertraline [Zoloft] 100 mg PO QDAY 12/03/14 06/11/22 12/09/14 History


 


Dexlansoprazole [Dexilant] 60 mg PO QDAY 12/10/14 06/11/22 12/09/14 History


 


LORazepam 1 mg PO BID 12/10/14 06/11/22 12/09/14 History


 


Apixaban [Eliquis] 5 mg PO Q12HR #60 tablet 06/13/22  Unknown Rx


 


Metoprolol [Lopressor TAB] 25 mg PO BID #60 tablet 06/13/22  Unknown Rx














Review of Systems


All systems: negative (as per hpi;)





Physical Examination





- Vital Signs


Vital Signs: 


                                   Vital Signs











Temp Pulse Resp BP Pulse Ox


 


 98.0 F   120 H  16   124/74   97 


 


 06/10/22 07:50  06/10/22 07:50  06/10/22 07:50  06/10/22 07:50  06/10/22 07:50














- Physical Exam


Narrative exam: 


Gen: nad, well-nourished;


Head: normocephalic;


Eyes: no gaze deviation; no ptosis;


ENT:  normal vocalization;


CVS: warm and well-perfused;


Pulm: no respiratory distress;


GI: appears non-distended;


Ext: no cyanosis appreciated at distal extremities;


Skin: no acute rash at distal extremities;


Heme: no pathologic ecchymosis appreciated at distal extremities;


Neuro: alert, oriented to name, age, month, year, surroundings, no dysarthria, 

no aphasia, CN 2 - PERRL, visual fields grossly intact, CN 3, 4, 6 - EOMI, CN 5 

- facial sensation symmetric to light touch, CN 7 - facial movement symmetric, 

CN 8 - hearing grossly intact, CN 9, 10 - uvula midline, CN 11  symmetric 

shoulder movement, CN 12 - tongue midline; Motor - at least 4/5 at all exts; 

Sensory - light touch symmetric, Cerebellar - fnf /hts intact, Gait - deferred 

secondary to fall risk;





Results





- Laboratory Findings


CBC and BMP: 


                                 06/13/22 05:48





                                 06/11/22 11:52


Abnormal Lab Findings: 


                                  Abnormal Labs











  06/10/22 06/10/22 06/11/22





  09:31 09:31 04:38


 


Hgb  14.6 H  


 


MCHC   


 


Lymph % (Auto)  10.6 L  


 


Mono % (Auto)  8.0 H  


 


Lymph # (Auto)  1.0 L  


 


Seg Neutrophils %  81.2 H  


 


Potassium   3.5 L  3.3 L


 


BUN   41 H  37 H


 


Creatinine   1.9 H 


 


Glucose   126 H  111 H


 


Total Protein   8.8 H 














  06/11/22





  11:52


 


Hgb 


 


MCHC  35 H


 


Lymph % (Auto) 


 


Mono % (Auto) 


 


Lymph # (Auto) 


 


Seg Neutrophils % 


 


Potassium 


 


BUN 


 


Creatinine 


 


Glucose 


 


Total Protein 














Assessment and Plan





70 yo female with htn, "seizure", gerd, arthritis, bilateral knee replacement, 

who presents with an episode where she was able to converse with her 

granddaughter (currently at bedside) while she was shaking at her bilateral 

extremities and during this time she was able to recognize Nicole and converse 

with her despite the bilateral shaking.  Similar episode in the past is referred

to as "seizure" in the setting of hypokalemia, per Nicole. 





1.  Convulsive Syncope - concern is raised based on clinical history and pt's 

ability to carry on a conversation while bilateral "shaking" is noted.





2.  Acute Kidney Injury - in the setting of possible dehydration; per primary 

team.





3.  Hypokalemia - common theme between last event and this event; ?cardiac 

dysrhythmia; management per primary team.





4.  Hypertension - aim for normotension; outpatient carotid us.





5.  No further acute neurologic workup indicated at present.  Neurology will 

signoff.





Omar Bui MD


Neurology


[id number]

## 2022-08-14 ENCOUNTER — HOSPITAL ENCOUNTER (EMERGENCY)
Dept: HOSPITAL 5 - ED | Age: 72
LOS: 1 days | Discharge: HOME | End: 2022-08-15
Payer: MEDICARE

## 2022-08-14 DIAGNOSIS — I10: ICD-10-CM

## 2022-08-14 DIAGNOSIS — R41.0: Primary | ICD-10-CM

## 2022-08-14 DIAGNOSIS — F31.9: ICD-10-CM

## 2022-08-14 DIAGNOSIS — Z88.5: ICD-10-CM

## 2022-08-14 PROCEDURE — 96361 HYDRATE IV INFUSION ADD-ON: CPT

## 2022-08-14 PROCEDURE — 99284 EMERGENCY DEPT VISIT MOD MDM: CPT

## 2022-08-14 PROCEDURE — 96360 HYDRATION IV INFUSION INIT: CPT

## 2022-08-14 PROCEDURE — 85610 PROTHROMBIN TIME: CPT

## 2022-08-14 PROCEDURE — 80320 DRUG SCREEN QUANTALCOHOLS: CPT

## 2022-08-14 PROCEDURE — G0480 DRUG TEST DEF 1-7 CLASSES: HCPCS

## 2022-08-14 PROCEDURE — 85730 THROMBOPLASTIN TIME PARTIAL: CPT

## 2022-08-14 PROCEDURE — 70450 CT HEAD/BRAIN W/O DYE: CPT

## 2022-08-14 PROCEDURE — 93005 ELECTROCARDIOGRAM TRACING: CPT

## 2022-08-14 PROCEDURE — 82550 ASSAY OF CK (CPK): CPT

## 2022-08-14 PROCEDURE — 84484 ASSAY OF TROPONIN QUANT: CPT

## 2022-08-14 PROCEDURE — 36415 COLL VENOUS BLD VENIPUNCTURE: CPT

## 2022-08-14 PROCEDURE — 85025 COMPLETE CBC W/AUTO DIFF WBC: CPT

## 2022-08-14 PROCEDURE — 96375 TX/PRO/DX INJ NEW DRUG ADDON: CPT

## 2022-08-14 PROCEDURE — 80307 DRUG TEST PRSMV CHEM ANLYZR: CPT

## 2022-08-14 PROCEDURE — 81001 URINALYSIS AUTO W/SCOPE: CPT

## 2022-08-14 PROCEDURE — 80053 COMPREHEN METABOLIC PANEL: CPT

## 2022-08-15 VITALS — DIASTOLIC BLOOD PRESSURE: 60 MMHG | SYSTOLIC BLOOD PRESSURE: 138 MMHG

## 2022-08-15 LAB
ALBUMIN SERPL-MCNC: 4.4 G/DL (ref 3.9–5)
ALT SERPL-CCNC: 13 UNITS/L (ref 7–56)
APTT BLD: 35.1 SEC. (ref 24.2–36.6)
BACTERIA #/AREA URNS HPF: (no result) /HPF
BENZODIAZEPINES SCREEN,URINE: (no result)
BUN SERPL-MCNC: 31 MG/DL (ref 7–17)
BUN/CREAT SERPL: 21 %
CALCIUM SERPL-MCNC: 10.5 MG/DL (ref 8.4–10.2)
HCT VFR BLD CALC: 42.9 % (ref 30.3–42.9)
HGB BLD-MCNC: 14.3 GM/DL (ref 10.1–14.3)
INR PPP: 1.15 (ref 0.87–1.13)
MCHC RBC AUTO-ENTMCNC: 33 % (ref 30–34)
MCV RBC AUTO: 93 FL (ref 79–97)
METHADONE SCREEN,URINE: (no result)
OPIATE SCREEN,URINE: (no result)
PH UR STRIP: 5 [PH] (ref 5–7)
PLATELET # BLD: 202 K/MM3 (ref 140–440)
PROT UR STRIP-MCNC: (no result) MG/DL
RBC # BLD AUTO: 4.62 M/MM3 (ref 3.65–5.03)
RBC #/AREA URNS HPF: 1 /HPF (ref 0–6)
UROBILINOGEN UR-MCNC: < 2 MG/DL (ref ?–2)
WBC #/AREA URNS HPF: 1 /HPF (ref 0–6)

## 2022-08-15 NOTE — ELECTROCARDIOGRAPH REPORT
[facility]

                                       

Test Date:    2022-08-15               Test Time:    02:52:50

Pat Name:     ROBB SAINI           Department:   

Patient ID:   [id number]          Room:          

Gender:       F                        Technician:   YADIEL

:          1950               Requested By: PACHECO JEFFREY

Order Number: [id number]             Reading MD:   John Reyes

                                 Measurements

Intervals                              Axis          

Rate:         103                      P:            

NJ:                                    QRS:          -46

QRSD:         104                      T:            128

QT:           362                                    

QTc:          474                                    

                           Interpretive Statements

Atrial fibrillation

Left anterior fascicular block

Low voltage, precordial leads

poor r wave prgression

Compared to ECG 06/10/2022 11:51:24

Left anterior fascicular block now present

Low QRS voltage now present

Electronically Signed On 8- 16:09:02 EDT by John Reyes

## 2022-08-15 NOTE — EMERGENCY DEPARTMENT REPORT
ED Altered Mental Status HPI





- General


Chief Complaint: Pain General


Stated Complaint: WEAKNESS


Time Seen by Provider: 08/15/22 01:36


Source: patient


Mode of arrival: Stretcher


Limitations: No Limitations





- History of Present Illness


Initial Comments: 





71-year-old female with a history of GERD, hypertension, bipolar disorder, and 

renal disease, and possibly 1 seizure many years ago presents to the emergency 

department with confusion noticed by her family, tonight.  Patient is alert and 

oriented by 3, and states that she feels slightly confused.  Patient's says that

she is having diffuse myalgias and tongue pain, and did have urinary 

incontinence.  Her daughter reports that when she was found, tonight she was 

more confused than she is, currently.  Patient has been seen for the same 

symptoms twice in the past, last time was approximately 1 month ago, and there 

was some concerned about the patient possibly having a seizure disorder.  

Patient continues to have a tremulous effort of the left arm, which she cannot 

control.  Patient has not been diagnosed with seizure disorder, but does report 

that these episodes of confusion do occur after she has not slept and has gone 

to work.  Patient denies any known aggravating or alleviating factors.  Patient 

is not on a seizure medication.  Patient reports compliance with her 

medications.





- Related Data


                                Home Medications











 Medication  Instructions  Recorded  Confirmed  Last Taken


 


Omeprazole Magnesium [PriLOSEC Otc] 20 mg PO QDAY 12/03/14 06/11/22 12/09/14


 


Quetiapine Fumarate (Nf) [SEROquel 150 mg PO QDAY 12/03/14 06/11/22 12/09/14





XR]    


 


Sertraline [Zoloft] 100 mg PO QDAY 12/03/14 06/11/22 12/09/14


 


Dexlansoprazole [Dexilant] 60 mg PO QDAY 12/10/14 06/11/22 12/09/14


 


LORazepam 1 mg PO BID 12/10/14 06/11/22 12/09/14








                                  Previous Rx's











 Medication  Instructions  Recorded  Last Taken  Type


 


Apixaban [Eliquis] 5 mg PO Q12HR #60 tablet 06/13/22 Unknown Rx


 


Metoprolol [Lopressor TAB] 25 mg PO BID #60 tablet 06/13/22 Unknown Rx











                                    Allergies











Allergy/AdvReac Type Severity Reaction Status Date / Time


 


codeine AdvReac  Itching Verified 06/10/22 07:52














ED Review of Systems


ROS: 


Stated complaint: WEAKNESS


Other details as noted in HPI





Comment: All other systems reviewed and negative (History is limited due to 

patient's confusion, which is improving.  Collateral information is gathered by 

her daughter.)


Constitutional: weakness.  denies: chills, fever


Eyes: denies: eye pain, eye discharge, vision change


ENT: other (Tongue pain).  denies: ear pain, throat pain


Respiratory: no symptoms reported.  denies: cough, shortness of breath, wheezing


Cardiovascular: denies: chest pain, palpitations


Endocrine: no symptoms reported


Gastrointestinal: denies: abdominal pain, nausea, diarrhea


Genitourinary: denies: urgency, dysuria, discharge


Musculoskeletal: denies: back pain, joint swelling, arthralgia


Skin: denies: rash, lesions


Neurological: other (Questionable seizure, left arm tremors).  denies: headache,

weakness, paresthesias


Psychiatric: other (Sleep disturbances).  denies: anxiety, depression, auditory 

hallucinations, visual hallucinations, homicidal thoughts, suicidal thoughts


Hematological/Lymphatic: denies: easy bleeding, easy bruising





ED Past Medical Hx





- Past Medical History


Previous Medical History?: Yes


Hx Hypertension: Yes


Hx GERD: Yes (1999)


Hx Renal Disease: Yes


Hx Seizures: Yes (X1 OCCURANCE R/T ECTOPIC PREGNANCY 1980)


Hx Psychiatric Treatment: Yes (Bipolar disorder)


Hx HIV: No





- Surgical History


Past Surgical History?: Yes


Additional Surgical History: Bilateral knee surgery





- Social History


Smoking Status: Never Smoker


Substance Use Type: None





- Medications


Home Medications: 


                                Home Medications











 Medication  Instructions  Recorded  Confirmed  Last Taken  Type


 


Omeprazole Magnesium [PriLOSEC Otc] 20 mg PO QDAY 12/03/14 06/11/22 12/09/14 

History


 


Quetiapine Fumarate (Nf) [SEROquel 150 mg PO QDAY 12/03/14 06/11/22 12/09/14 

History





XR]     


 


Sertraline [Zoloft] 100 mg PO QDAY 12/03/14 06/11/22 12/09/14 History


 


Dexlansoprazole [Dexilant] 60 mg PO QDAY 12/10/14 06/11/22 12/09/14 History


 


LORazepam 1 mg PO BID 12/10/14 06/11/22 12/09/14 History


 


Apixaban [Eliquis] 5 mg PO Q12HR #60 tablet 06/13/22  Unknown Rx


 


Metoprolol [Lopressor TAB] 25 mg PO BID #60 tablet 06/13/22  Unknown Rx














ED Physical Exam





- General


Limitations: No Limitations


General appearance: alert, in no apparent distress





- Head


Head exam: Present: atraumatic, normocephalic





- Eye


Eye exam: Present: normal appearance, PERRL, EOMI





- ENT


ENT exam: Present: mucous membranes moist





- Neck


Neck exam: Present: normal inspection





- Respiratory


Respiratory exam: Present: normal lung sounds bilaterally.  Absent: respiratory 

distress





- Cardiovascular


Cardiovascular Exam: Present: normal rhythm, tachycardia.  Absent: systolic 

murmur, diastolic murmur, rubs, gallop





- GI/Abdominal


GI/Abdominal exam: Present: soft, normal bowel sounds





- Extremities Exam


Extremities exam: Present: normal inspection





- Back Exam


Back exam: Present: normal inspection





- Neurological Exam


Neurological exam: Present: alert, oriented X3, other (Left arm is tremulous)





- Psychiatric


Psychiatric exam: Present: normal affect, normal mood





- Skin


Skin exam: Present: warm, dry, intact, normal color.  Absent: rash





ED Course


                                   Vital Signs











  08/14/22 08/15/22 08/15/22





  23:53 01:01 01:16


 


Temperature 98 F  


 


Pulse Rate 98 H  91 H


 


Respiratory 18 14 13





Rate   


 


Blood Pressure 156/79  142/64


 


O2 Sat by Pulse 96  93





Oximetry   














  08/15/22 08/15/22 08/15/22





  01:30 02:29 02:30


 


Temperature   


 


Pulse Rate 97 H  120 H


 


Respiratory 16  18





Rate   


 


Blood Pressure 122/85 142/64 142/64


 


O2 Sat by Pulse 90 94 94





Oximetry   














  08/15/22 08/15/22 08/15/22





  02:46 03:00 03:16


 


Temperature   


 


Pulse Rate 101 H 101 H 100 H


 


Respiratory 12 12 12





Rate   


 


Blood Pressure 115/70 142/64 


 


O2 Sat by Pulse 93 95 95





Oximetry   














  08/15/22 08/15/22 08/15/22





  03:46 04:00 04:16


 


Temperature   


 


Pulse Rate  95 H 99 H


 


Respiratory 23 15 14





Rate   


 


Blood Pressure 115/70 115/70 115/70


 


O2 Sat by Pulse  93 96





Oximetry   














- Lab Data


Result diagrams: 


                                 08/15/22 02:23





                                 08/15/22 02:23


                                   Lab Results











  08/15/22 08/15/22 08/15/22 Range/Units





  02:22 02:22 02:22 


 


WBC     (4.5-11.0)  K/mm3


 


RBC     (3.65-5.03)  M/mm3


 


Hgb     (10.1-14.3)  gm/dl


 


Hct     (30.3-42.9)  %


 


MCV     (79-97)  fl


 


MCH     (28-32)  pg


 


MCHC     (30-34)  %


 


RDW     (13.2-15.2)  %


 


Plt Count     (140-440)  K/mm3


 


Lymph % (Auto)     


 


Mono % (Auto)     


 


Eos % (Auto)     


 


Baso % (Auto)     


 


Lymph # (Auto)     


 


Mono # (Auto)     


 


Eos # (Auto)     


 


Baso # (Auto)     


 


Seg Neutrophils %     


 


Seg Neutrophils #     


 


PT     (12.2-14.9)  Sec.


 


INR     (0.87-1.13)  


 


APTT     (24.2-36.6)  Sec.


 


Sodium     (137-145)  mmol/L


 


Potassium     (3.6-5.0)  mmol/L


 


Chloride     ()  mmol/L


 


Carbon Dioxide     (22-30)  mmol/L


 


Anion Gap     mmol/L


 


BUN     (7-17)  mg/dL


 


Creatinine     (0.6-1.2)  mg/dL


 


Estimated GFR     ml/min


 


BUN/Creatinine Ratio     %


 


Glucose     ()  mg/dL


 


Calcium     (8.4-10.2)  mg/dL


 


Total Bilirubin     (0.1-1.2)  mg/dL


 


AST     (5-40)  units/L


 


ALT     (7-56)  units/L


 


Alkaline Phosphatase     ()  units/L


 


Total Creatine Kinase     ()  units/L


 


Troponin T     (0.00-0.029)  ng/mL


 


Total Protein     (6.3-8.2)  g/dL


 


Albumin     (3.9-5)  g/dL


 


Albumin/Globulin Ratio     %


 


Urine Color     (Yellow)  


 


Urine Turbidity     (Clear)  


 


Urine pH     (5.0-7.0)  


 


Ur Specific Gravity     (1.003-1.030)  


 


Urine Protein     (Negative)  mg/dL


 


Urine Glucose (UA)     (Negative)  mg/dL


 


Urine Ketones     (Negative)  mg/dL


 


Urine Blood     (Negative)  


 


Urine Nitrite     (Negative)  


 


Ur Reducing Substances     


 


Urine Bilirubin     (Negative)  


 


Urine Ictotest     


 


Urine Urobilinogen     (<2.0)  mg/dL


 


Ur Leukocyte Esterase     (Negative)  


 


Urine WBC (Auto)     (0.0-6.0)  /HPF


 


Urine RBC (Auto)     (0.0-6.0)  /HPF


 


U Epithel Cells (Auto)     (0-13.0)  /HPF


 


Urine Bacteria (Auto)     (Negative)  /HPF


 


Salicylates  < 0.3 L    (2.8-20.0)  mg/dL


 


Urine Opiates Screen     


 


Urine Methadone Screen     


 


Acetaminophen   5.0 L   (10.0-30.0)  ug/mL


 


Ur Barbiturates Screen     


 


Ur Phencyclidine Scrn     


 


Ur Amphetamines Screen     


 


U Benzodiazepines Scrn     


 


Urine Cocaine Screen     


 


U Marijuana (THC) Screen     


 


Drugs of Abuse Note     


 


Plasma/Serum Alcohol    < 0.01  (0-0.07)  %














  08/15/22 08/15/22 08/15/22 Range/Units





  02:23 02:23 02:23 


 


WBC  10.3    (4.5-11.0)  K/mm3


 


RBC  4.62    (3.65-5.03)  M/mm3


 


Hgb  14.3    (10.1-14.3)  gm/dl


 


Hct  42.9    (30.3-42.9)  %


 


MCV  93    (79-97)  fl


 


MCH  31    (28-32)  pg


 


MCHC  33    (30-34)  %


 


RDW  14.1    (13.2-15.2)  %


 


Plt Count  202    (140-440)  K/mm3


 


Lymph % (Auto)  Np    


 


Mono % (Auto)  Np    


 


Eos % (Auto)  Np    


 


Baso % (Auto)  Np    


 


Lymph # (Auto)  Np    


 


Mono # (Auto)  Np    


 


Eos # (Auto)  Np    


 


Baso # (Auto)  Np    


 


Seg Neutrophils %  Np    


 


Seg Neutrophils #  Np    


 


PT   16.0 H   (12.2-14.9)  Sec.


 


INR   1.15 H   (0.87-1.13)  


 


APTT   35.1   (24.2-36.6)  Sec.


 


Sodium    136 L  (137-145)  mmol/L


 


Potassium    4.2  (3.6-5.0)  mmol/L


 


Chloride    96.4 L  ()  mmol/L


 


Carbon Dioxide    23  (22-30)  mmol/L


 


Anion Gap    21  mmol/L


 


BUN    31 H  (7-17)  mg/dL


 


Creatinine    1.5 H  (0.6-1.2)  mg/dL


 


Estimated GFR    34  ml/min


 


BUN/Creatinine Ratio    21  %


 


Glucose    144 H  ()  mg/dL


 


Calcium    10.5 H  (8.4-10.2)  mg/dL


 


Total Bilirubin    0.70  (0.1-1.2)  mg/dL


 


AST    18  (5-40)  units/L


 


ALT    13  (7-56)  units/L


 


Alkaline Phosphatase    97  ()  units/L


 


Total Creatine Kinase    108  ()  units/L


 


Troponin T    < 0.010  (0.00-0.029)  ng/mL


 


Total Protein    7.3  (6.3-8.2)  g/dL


 


Albumin    4.4  (3.9-5)  g/dL


 


Albumin/Globulin Ratio    1.5  %


 


Urine Color     (Yellow)  


 


Urine Turbidity     (Clear)  


 


Urine pH     (5.0-7.0)  


 


Ur Specific Gravity     (1.003-1.030)  


 


Urine Protein     (Negative)  mg/dL


 


Urine Glucose (UA)     (Negative)  mg/dL


 


Urine Ketones     (Negative)  mg/dL


 


Urine Blood     (Negative)  


 


Urine Nitrite     (Negative)  


 


Ur Reducing Substances     


 


Urine Bilirubin     (Negative)  


 


Urine Ictotest     


 


Urine Urobilinogen     (<2.0)  mg/dL


 


Ur Leukocyte Esterase     (Negative)  


 


Urine WBC (Auto)     (0.0-6.0)  /HPF


 


Urine RBC (Auto)     (0.0-6.0)  /HPF


 


U Epithel Cells (Auto)     (0-13.0)  /HPF


 


Urine Bacteria (Auto)     (Negative)  /HPF


 


Salicylates     (2.8-20.0)  mg/dL


 


Urine Opiates Screen     


 


Urine Methadone Screen     


 


Acetaminophen     (10.0-30.0)  ug/mL


 


Ur Barbiturates Screen     


 


Ur Phencyclidine Scrn     


 


Ur Amphetamines Screen     


 


U Benzodiazepines Scrn     


 


Urine Cocaine Screen     


 


U Marijuana (THC) Screen     


 


Drugs of Abuse Note     


 


Plasma/Serum Alcohol     (0-0.07)  %














  08/15/22 08/15/22 Range/Units





  02:43 02:43 


 


WBC    (4.5-11.0)  K/mm3


 


RBC    (3.65-5.03)  M/mm3


 


Hgb    (10.1-14.3)  gm/dl


 


Hct    (30.3-42.9)  %


 


MCV    (79-97)  fl


 


MCH    (28-32)  pg


 


MCHC    (30-34)  %


 


RDW    (13.2-15.2)  %


 


Plt Count    (140-440)  K/mm3


 


Lymph % (Auto)    


 


Mono % (Auto)    


 


Eos % (Auto)    


 


Baso % (Auto)    


 


Lymph # (Auto)    


 


Mono # (Auto)    


 


Eos # (Auto)    


 


Baso # (Auto)    


 


Seg Neutrophils %    


 


Seg Neutrophils #    


 


PT    (12.2-14.9)  Sec.


 


INR    (0.87-1.13)  


 


APTT    (24.2-36.6)  Sec.


 


Sodium    (137-145)  mmol/L


 


Potassium    (3.6-5.0)  mmol/L


 


Chloride    ()  mmol/L


 


Carbon Dioxide    (22-30)  mmol/L


 


Anion Gap    mmol/L


 


BUN    (7-17)  mg/dL


 


Creatinine    (0.6-1.2)  mg/dL


 


Estimated GFR    ml/min


 


BUN/Creatinine Ratio    %


 


Glucose    ()  mg/dL


 


Calcium    (8.4-10.2)  mg/dL


 


Total Bilirubin    (0.1-1.2)  mg/dL


 


AST    (5-40)  units/L


 


ALT    (7-56)  units/L


 


Alkaline Phosphatase    ()  units/L


 


Total Creatine Kinase    ()  units/L


 


Troponin T    (0.00-0.029)  ng/mL


 


Total Protein    (6.3-8.2)  g/dL


 


Albumin    (3.9-5)  g/dL


 


Albumin/Globulin Ratio    %


 


Urine Color  Yellow   (Yellow)  


 


Urine Turbidity  Slightly cloudy   (Clear)  


 


Urine pH  5.0   (5.0-7.0)  


 


Ur Specific Gravity  1.025   (1.003-1.030)  


 


Urine Protein  <15 mg/dl   (Negative)  mg/dL


 


Urine Glucose (UA)  Negative   (Negative)  mg/dL


 


Urine Ketones  Negative   (Negative)  mg/dL


 


Urine Blood  Negative   (Negative)  


 


Urine Nitrite  Negative   (Negative)  


 


Ur Reducing Substances  Not Reportable   


 


Urine Bilirubin  Negative   (Negative)  


 


Urine Ictotest  Not Reportable   


 


Urine Urobilinogen  < 2.0   (<2.0)  mg/dL


 


Ur Leukocyte Esterase  Negative   (Negative)  


 


Urine WBC (Auto)  1.0   (0.0-6.0)  /HPF


 


Urine RBC (Auto)  1.0   (0.0-6.0)  /HPF


 


U Epithel Cells (Auto)  < 1.0   (0-13.0)  /HPF


 


Urine Bacteria (Auto)  1+   (Negative)  /HPF


 


Salicylates    (2.8-20.0)  mg/dL


 


Urine Opiates Screen   Presumptive negative  


 


Urine Methadone Screen   Presumptive negative  


 


Acetaminophen    (10.0-30.0)  ug/mL


 


Ur Barbiturates Screen   Presumptive negative  


 


Ur Phencyclidine Scrn   Presumptive negative  


 


Ur Amphetamines Screen   Presumptive positive  


 


U Benzodiazepines Scrn   Presumptive negative  


 


Urine Cocaine Screen   Presumptive negative  


 


U Marijuana (THC) Screen   Presumptive negative  


 


Drugs of Abuse Note   Disclamer  


 


Plasma/Serum Alcohol    (0-0.07)  %














- Differential Diagnosis


Seizure, intoxication, acute psychiatric episode, dementia


Critical care attestation.: 


If time is entered above; I have spent that time in minutes in the direct care 

of this critically ill patient, excluding procedure time.








ED Disposition


Clinical Impression: 


 Confusion and disorientation





Disposition: 01 HOME / SELF CARE / HOMELESS


Is pt being admited?: No


Condition: Stable


Instructions:  Confusion


Additional Instructions: 


Please follow-up with a Neurologist, soon.


Referrals: 


PRIMARY CARE,MD [Referring] - 3-5 Days


PRAMOD GERARDO MD [Referring] - 3-5 Days


Time of Disposition: 05:18

## 2022-08-15 NOTE — CAT SCAN REPORT
. CT HEAD WITHOUT CONTRAST



INDICATION / CLINICAL INFORMATION:

Altered Mental Status.



TECHNIQUE:

All CT scans at this location are performed using CT dose reduction for ALARA by means of automated e
xposure control. 



COMPARISON:

6/10/2022



FINDINGS:



No acute intracranial hemorrhage. Ventricles are normal in size without midline shift or mass effect.
 No extra-axial fluid collection is seen. Visualized portions of the sella appear normal. No large te
rritorial infarct is definitely seen. Visualized sinuses are clear.



ADDITIONAL FINDINGS: None.



IMPRESSION:

1. No significant interval change since prior examination. No acute hemorrhage.



Signer Name: Solis Carmen MD 

Signed: 8/15/2022 4:32 AM

Workstation Name: [id number]

## 2022-08-15 NOTE — EMERGENCY DEPARTMENT REPORT
Blank Doc





- Documentation


Documentation: 





[facility] Consult Note





# Demographics


Consult Type: General Neurology





Patient Location: Emergency Room





First Name: Ksenia





Last Name: Meme





YOB: 1950





Age: 71





Gender: Female





Facility: [facility]





Time of Initial Page (Eastern Time): 08/15/2022, 04:45





Time of Return Call (Eastern Time): 08/15/2022, 04:49








# HPI


History: 70 yo woman hx of bipolar disorder, possible history of seizure, found 

today confused, possibly has not slept in days. Today as found confused. Family 

member thought there may have been a seizures. Patient Notes she is having hand 

trembling and shaking, which is present at this time. No loss of consciousness.


Patient is on seroquel, zoloft, eliquis.








# Scores


Time of exam and NIHSS (Eastern Time): 08/15/2022, 05:07





Level of Consciousness 1a: [0] = Alert; keenly responsive





LOC Questions 1b: [0] = Answers both questions correctly





LOC Commands 1c: [0] = Performs both tasks correctly





Best Gaze 2: [0] = Normal





Visual 3: [0] = No visual loss





Facial Palsy 4: [0] = Normal symmetrical movements





Motor Arm Left 5a: [0] = No drift





Motor Arm Right 5b: [0] = No drift





Motor Leg Left 6a: [0] = No drift





Motor Leg Right 6b: [0] = No drift





Limb Ataxia 7: [0] = Absent





Sensory 8: [0] = Normal





Best Language 9: [0] = No aphasia





Dysarthria 10: [0] = Normal





Extinction and Inattention 11: [0] = No abnormality





NIHSS Total: 0








# Data


Head CT:


no bleed








# Assessment


Impression:


bilateral upper ext tremor, possible myoclonic jerking in setting of bipolar 

disorder, possible long period of sleep deprivation.


Seizure in this setting would be very unlikely. These symptoms are usually seen 

in metabolic derangements, toxic encephalopathies, etc.








# Plan


Additional Recommendations: recs:


metabolic/infectious work up


mri brain w/wo contrast


eeg routine.


consider psycho consults for management of bipolar disorder/manic state 

management


no need for anti epileptic unless patient has witnessed seizure.


can consider imaging and eeg as outpatient from neurology standpoint. . 








# Logistics


Telemedicine: Interactive 2 way audio and visual telecommunication technology 

was utilized during this visit








# Demographics


First Name: Ksenia





Last Name: Meme





Facility: [facility]